# Patient Record
Sex: MALE | URBAN - NONMETROPOLITAN AREA
[De-identification: names, ages, dates, MRNs, and addresses within clinical notes are randomized per-mention and may not be internally consistent; named-entity substitution may affect disease eponyms.]

---

## 2017-06-26 ENCOUNTER — OPTICAL OFFICE (OUTPATIENT)
Dept: URBAN - NONMETROPOLITAN AREA CLINIC 4 | Facility: CLINIC | Age: 22
Setting detail: OPHTHALMOLOGY
End: 2017-06-26

## 2017-06-26 ENCOUNTER — RX ONLY (RX ONLY)
Age: 22
End: 2017-06-26

## 2017-06-26 ENCOUNTER — DOCTOR'S OFFICE (OUTPATIENT)
Dept: URBAN - NONMETROPOLITAN AREA CLINIC 1 | Facility: CLINIC | Age: 22
Setting detail: OPHTHALMOLOGY
End: 2017-06-26
Payer: COMMERCIAL

## 2017-06-26 DIAGNOSIS — H52.13: ICD-10-CM

## 2017-06-26 PROCEDURE — 92014 COMPRE OPH EXAM EST PT 1/>: CPT | Performed by: OPTOMETRIST

## 2017-06-26 PROCEDURE — S0500 DISPOS CONT LENS: HCPCS | Performed by: OPTOMETRIST

## 2017-06-26 PROCEDURE — 92310 CONTACT LENS FITTING OU: CPT | Performed by: OPTOMETRIST

## 2017-06-26 ASSESSMENT — REFRACTION_OUTSIDERX
OD_VA3: 20/
OS_AXIS: 080
OS_VA3: 20/
OD_VA2: 20/20
OD_VA1: 20/20
OD_AXIS: 105
OS_SPHERE: -5.50
OU_VA: 20/20
OS_VA1: 20/20
OD_SPHERE: -5.25
OD_CYLINDER: -0.50
OS_CYLINDER: -0.75
OS_VA2: 20/20

## 2017-06-26 ASSESSMENT — REFRACTION_MANIFEST
OD_VA1: 20/
OS_VA2: 20/
OD_VA1: 20/
OU_VA: 20/
OD_VA2: 20/
OD_VA3: 20/
OU_VA: 20/
OS_VA2: 20/
OD_VA2: 20/
OS_VA3: 20/
OS_VA1: 20/
OD_VA3: 20/
OS_VA1: 20/
OS_VA3: 20/

## 2017-06-26 ASSESSMENT — VISUAL ACUITY
OD_BCVA: 20/25-2
OS_BCVA: 20/30+2

## 2017-06-26 ASSESSMENT — REFRACTION_AUTOREFRACTION
OD_AXIS: 109
OD_CYLINDER: -0.50
OS_SPHERE: -5.25
OS_CYLINDER: -0.75
OD_SPHERE: -5.00
OS_AXIS: 78

## 2017-06-26 ASSESSMENT — REFRACTION_CURRENTRX
OS_CYLINDER: -1.00
OS_OVR_VA: 20/
OD_OVR_VA: 20/
OD_OVR_VA: 20/
OD_AXIS: 101
OS_OVR_VA: 20/
OS_AXIS: 73
OS_OVR_VA: 20/
OD_OVR_VA: 20/
OD_VPRISM_DIRECTION: SV
OS_VPRISM_DIRECTION: SV
OS_SPHERE: -5.25
OD_CYLINDER: -0.75
OD_SPHERE: -5.00

## 2017-06-26 ASSESSMENT — SPHEQUIV_DERIVED
OD_SPHEQUIV: -5.25
OS_SPHEQUIV: -5.625

## 2017-06-26 ASSESSMENT — CONFRONTATIONAL VISUAL FIELD TEST (CVF)
OD_FINDINGS: FULL
OS_FINDINGS: FULL

## 2017-12-13 ENCOUNTER — OPTICAL OFFICE (OUTPATIENT)
Dept: URBAN - NONMETROPOLITAN AREA CLINIC 4 | Facility: CLINIC | Age: 22
Setting detail: OPHTHALMOLOGY
End: 2017-12-13
Payer: COMMERCIAL

## 2017-12-13 DIAGNOSIS — H52.13: ICD-10-CM

## 2017-12-13 PROCEDURE — S0500 DISPOS CONT LENS: HCPCS | Performed by: OPTOMETRIST

## 2018-09-07 ENCOUNTER — DOCTOR'S OFFICE (OUTPATIENT)
Dept: URBAN - NONMETROPOLITAN AREA CLINIC 1 | Facility: CLINIC | Age: 23
Setting detail: OPHTHALMOLOGY
End: 2018-09-07
Payer: COMMERCIAL

## 2018-09-07 DIAGNOSIS — H52.13: ICD-10-CM

## 2018-09-07 DIAGNOSIS — Z01.00: ICD-10-CM

## 2018-09-07 PROCEDURE — 92014 COMPRE OPH EXAM EST PT 1/>: CPT | Performed by: OPTOMETRIST

## 2018-09-07 ASSESSMENT — REFRACTION_AUTOREFRACTION
OS_CYLINDER: -0.75
OS_AXIS: 78
OD_SPHERE: -5.00
OD_AXIS: 109
OS_SPHERE: -5.25
OD_CYLINDER: -0.50

## 2018-09-07 ASSESSMENT — REFRACTION_MANIFEST
OD_VA2: 20/
OS_VA2: 20/
OS_VA1: 20/
OS_VA3: 20/
OS_SPHERE: -5.50
OS_VA2: 20/20
OD_CYLINDER: -0.50
OD_VA2: 20/20
OS_VA3: 20/
OD_VA1: 20/20
OD_VA1: 20/
OD_AXIS: 105
OS_VA1: 20/20
OD_SPHERE: -5.25
OD_VA3: 20/
OU_VA: 20/20
OS_CYLINDER: -0.75
OD_VA3: 20/
OS_AXIS: 080
OU_VA: 20/

## 2018-09-07 ASSESSMENT — SPHEQUIV_DERIVED
OD_SPHEQUIV: -5.25
OS_SPHEQUIV: -5.875
OS_SPHEQUIV: -5.625
OD_SPHEQUIV: -5.5

## 2018-09-07 ASSESSMENT — VISUAL ACUITY
OD_BCVA: 20/25+2
OS_BCVA: 20/20-1

## 2018-09-07 ASSESSMENT — REFRACTION_CURRENTRX
OS_SPHERE: -5.25
OD_CYLINDER: -0.75
OS_OVR_VA: 20/
OD_VPRISM_DIRECTION: SV
OS_OVR_VA: 20/
OS_AXIS: 73
OD_OVR_VA: 20/
OD_OVR_VA: 20/
OS_VPRISM_DIRECTION: SV
OD_SPHERE: -5.00
OD_OVR_VA: 20/
OS_CYLINDER: -1.00
OS_OVR_VA: 20/
OD_AXIS: 101

## 2018-09-07 ASSESSMENT — CONFRONTATIONAL VISUAL FIELD TEST (CVF)
OD_FINDINGS: FULL
OS_FINDINGS: FULL

## 2018-09-14 ENCOUNTER — OPTICAL OFFICE (OUTPATIENT)
Dept: URBAN - NONMETROPOLITAN AREA CLINIC 4 | Facility: CLINIC | Age: 23
Setting detail: OPHTHALMOLOGY
End: 2018-09-14

## 2018-09-14 DIAGNOSIS — H52.13: ICD-10-CM

## 2018-09-14 PROCEDURE — S0500 DISPOS CONT LENS: HCPCS | Performed by: OPTOMETRIST

## 2019-02-18 ENCOUNTER — OPTICAL OFFICE (OUTPATIENT)
Dept: URBAN - NONMETROPOLITAN AREA CLINIC 4 | Facility: CLINIC | Age: 24
Setting detail: OPHTHALMOLOGY
End: 2019-02-18
Payer: COMMERCIAL

## 2019-02-18 ENCOUNTER — OPTICAL OFFICE (OUTPATIENT)
Dept: URBAN - NONMETROPOLITAN AREA CLINIC 4 | Facility: CLINIC | Age: 24
Setting detail: OPHTHALMOLOGY
End: 2019-02-18

## 2019-02-18 DIAGNOSIS — H52.13: ICD-10-CM

## 2019-02-18 DIAGNOSIS — H52.223: ICD-10-CM

## 2019-02-18 PROCEDURE — V2744 TINT PHOTOCHROMATIC LENS/ES: HCPCS | Performed by: OPTOMETRIST

## 2019-02-18 PROCEDURE — V2025 EYEGLASSES DELUX FRAMES: HCPCS | Performed by: OPTOMETRIST

## 2019-02-18 PROCEDURE — V2107 SPHEROCYLINDER 4.25D/12-2D: HCPCS | Performed by: OPTOMETRIST

## 2019-02-18 PROCEDURE — V2750 ANTI-REFLECTIVE COATING: HCPCS | Performed by: OPTOMETRIST

## 2019-02-18 PROCEDURE — V2020 VISION SVCS FRAMES PURCHASES: HCPCS | Performed by: OPTOMETRIST

## 2019-02-18 PROCEDURE — V2783 LENS, >= 1.66 P/>=1.80 G: HCPCS | Performed by: OPTOMETRIST

## 2019-02-18 PROCEDURE — S0500 DISPOS CONT LENS: HCPCS | Performed by: OPTOMETRIST

## 2019-08-14 ENCOUNTER — OPTICAL OFFICE (OUTPATIENT)
Dept: URBAN - NONMETROPOLITAN AREA CLINIC 4 | Facility: CLINIC | Age: 24
Setting detail: OPHTHALMOLOGY
End: 2019-08-14

## 2019-08-14 DIAGNOSIS — H52.13: ICD-10-CM

## 2019-08-14 PROCEDURE — S0500 DISPOS CONT LENS: HCPCS | Performed by: OPTOMETRIST

## 2019-09-10 ENCOUNTER — DOCTOR'S OFFICE (OUTPATIENT)
Dept: URBAN - NONMETROPOLITAN AREA CLINIC 1 | Facility: CLINIC | Age: 24
Setting detail: OPHTHALMOLOGY
End: 2019-09-10
Payer: COMMERCIAL

## 2019-09-10 DIAGNOSIS — H52.13: ICD-10-CM

## 2019-09-10 PROCEDURE — 92014 COMPRE OPH EXAM EST PT 1/>: CPT | Performed by: OPTOMETRIST

## 2019-09-10 PROCEDURE — 92310 CONTACT LENS FITTING OU: CPT | Performed by: OPTOMETRIST

## 2019-09-10 ASSESSMENT — REFRACTION_MANIFEST
OD_VA1: 20/20
OS_CYLINDER: -0.75
OD_VA2: 20/20
OS_VA2: 20/20
OS_AXIS: 080
OS_VA1: 20/20
OS_VA3: 20/
OS_SPHERE: -5.50
OD_AXIS: 105
OD_VA2: 20/
OD_VA3: 20/
OD_CYLINDER: -0.50
OU_VA: 20/20
OS_VA3: 20/
OD_VA3: 20/
OU_VA: 20/
OD_VA1: 20/
OS_VA1: 20/
OD_SPHERE: -5.25
OS_VA2: 20/

## 2019-09-10 ASSESSMENT — REFRACTION_CURRENTRX
OS_OVR_VA: 20/
OD_OVR_VA: 20/
OD_SPHERE: -5.25
OS_VPRISM_DIRECTION: SV
OS_OVR_VA: 20/
OS_CYLINDER: -0.75
OS_SPHERE: -5.50
OS_OVR_VA: 20/
OD_OVR_VA: 20/
OD_CYLINDER: -0.50
OD_AXIS: 105
OS_AXIS: 080
OD_OVR_VA: 20/
OD_VPRISM_DIRECTION: SV

## 2019-09-10 ASSESSMENT — REFRACTION_AUTOREFRACTION
OD_AXIS: 101
OS_SPHERE: -5.50
OS_AXIS: 69
OD_SPHERE: -5.00
OS_CYLINDER: -0.75
OD_CYLINDER: -0.25

## 2019-09-10 ASSESSMENT — VISUAL ACUITY
OS_BCVA: 20/20-2
OD_BCVA: 20/20-2

## 2019-09-10 ASSESSMENT — SPHEQUIV_DERIVED
OD_SPHEQUIV: -5.125
OS_SPHEQUIV: -5.875
OS_SPHEQUIV: -5.875
OD_SPHEQUIV: -5.5

## 2019-09-10 ASSESSMENT — CONFRONTATIONAL VISUAL FIELD TEST (CVF)
OD_FINDINGS: FULL
OS_FINDINGS: FULL

## 2020-01-31 ENCOUNTER — OPTICAL OFFICE (OUTPATIENT)
Dept: URBAN - NONMETROPOLITAN AREA CLINIC 4 | Facility: CLINIC | Age: 25
Setting detail: OPHTHALMOLOGY
End: 2020-01-31

## 2020-01-31 DIAGNOSIS — H52.13: ICD-10-CM

## 2020-01-31 PROCEDURE — S0500 DISPOS CONT LENS: HCPCS | Performed by: OPTOMETRIST

## 2020-07-22 ENCOUNTER — OPTICAL OFFICE (OUTPATIENT)
Dept: URBAN - NONMETROPOLITAN AREA CLINIC 4 | Facility: CLINIC | Age: 25
Setting detail: OPHTHALMOLOGY
End: 2020-07-22

## 2020-07-22 DIAGNOSIS — H52.13: ICD-10-CM

## 2020-07-22 PROCEDURE — S0500 DISPOS CONT LENS: HCPCS | Performed by: OPTOMETRIST

## 2020-09-14 ENCOUNTER — DOCTOR'S OFFICE (OUTPATIENT)
Dept: URBAN - NONMETROPOLITAN AREA CLINIC 1 | Facility: CLINIC | Age: 25
Setting detail: OPHTHALMOLOGY
End: 2020-09-14
Payer: COMMERCIAL

## 2020-09-14 DIAGNOSIS — Z01.00: ICD-10-CM

## 2020-09-14 DIAGNOSIS — H52.13: ICD-10-CM

## 2020-09-14 PROCEDURE — 92014 COMPRE OPH EXAM EST PT 1/>: CPT | Performed by: OPTOMETRIST

## 2020-09-14 ASSESSMENT — CONFRONTATIONAL VISUAL FIELD TEST (CVF)
OD_FINDINGS: FULL
OS_FINDINGS: FULL

## 2020-12-29 ENCOUNTER — OPTICAL OFFICE (OUTPATIENT)
Dept: URBAN - NONMETROPOLITAN AREA CLINIC 4 | Facility: CLINIC | Age: 25
Setting detail: OPHTHALMOLOGY
End: 2020-12-29
Payer: COMMERCIAL

## 2020-12-29 DIAGNOSIS — H52.13: ICD-10-CM

## 2020-12-29 PROCEDURE — S0500 DISPOS CONT LENS: HCPCS | Performed by: OPTOMETRIST

## 2021-01-04 ASSESSMENT — REFRACTION_MANIFEST
OS_VA2: 20/20
OU_VA: 20/20
OD_CYLINDER: -0.50
OS_CYLINDER: -0.75
OD_AXIS: 105
OD_VA2: 20/20
OD_SPHERE: -5.25
OS_VA1: 20/20
OS_AXIS: 080
OD_VA1: 20/20
OS_SPHERE: -5.50

## 2021-01-04 ASSESSMENT — SPHEQUIV_DERIVED
OS_SPHEQUIV: -5.875
OD_SPHEQUIV: -5.5
OD_SPHEQUIV: -5.125
OS_SPHEQUIV: -5.875

## 2021-01-04 ASSESSMENT — REFRACTION_CURRENTRX
OD_AXIS: 105
OS_OVR_VA: 20/
OD_SPHERE: -5.25
OS_CYLINDER: -0.75
OD_CYLINDER: -0.50
OS_AXIS: 080
OS_SPHERE: -5.50
OD_VPRISM_DIRECTION: SV
OS_VPRISM_DIRECTION: SV
OD_OVR_VA: 20/

## 2021-01-04 ASSESSMENT — VISUAL ACUITY
OS_BCVA: 20/25-2
OD_BCVA: 20/25-2

## 2021-01-04 ASSESSMENT — REFRACTION_AUTOREFRACTION
OS_AXIS: 69
OD_SPHERE: -5.00
OD_CYLINDER: -0.25
OS_SPHERE: -5.50
OD_AXIS: 101
OS_CYLINDER: -0.75

## 2021-03-16 ENCOUNTER — OPTICAL OFFICE (OUTPATIENT)
Dept: URBAN - NONMETROPOLITAN AREA CLINIC 4 | Facility: CLINIC | Age: 26
Setting detail: OPHTHALMOLOGY
End: 2021-03-16

## 2021-03-16 DIAGNOSIS — H52.13: ICD-10-CM

## 2021-03-16 PROCEDURE — S0500 DISPOS CONT LENS: HCPCS | Performed by: OPTOMETRIST

## 2021-06-17 ENCOUNTER — OPTICAL OFFICE (OUTPATIENT)
Dept: URBAN - NONMETROPOLITAN AREA CLINIC 4 | Facility: CLINIC | Age: 26
Setting detail: OPHTHALMOLOGY
End: 2021-06-17

## 2021-06-17 DIAGNOSIS — H52.13: ICD-10-CM

## 2021-06-17 PROCEDURE — S0500 DISPOS CONT LENS: HCPCS | Performed by: OPTOMETRIST

## 2021-09-15 ENCOUNTER — DOCTOR'S OFFICE (OUTPATIENT)
Dept: URBAN - NONMETROPOLITAN AREA CLINIC 1 | Facility: CLINIC | Age: 26
Setting detail: OPHTHALMOLOGY
End: 2021-09-15
Payer: COMMERCIAL

## 2021-09-15 ENCOUNTER — OPTICAL OFFICE (OUTPATIENT)
Dept: URBAN - NONMETROPOLITAN AREA CLINIC 4 | Facility: CLINIC | Age: 26
Setting detail: OPHTHALMOLOGY
End: 2021-09-15

## 2021-09-15 DIAGNOSIS — H52.13: ICD-10-CM

## 2021-09-15 DIAGNOSIS — Z01.00: ICD-10-CM

## 2021-09-15 PROCEDURE — S0500 DISPOS CONT LENS: HCPCS | Performed by: OPTOMETRIST

## 2021-09-15 PROCEDURE — 92310 CONTACT LENS FITTING OU: CPT | Performed by: OPTOMETRIST

## 2021-09-15 PROCEDURE — 92014 COMPRE OPH EXAM EST PT 1/>: CPT | Performed by: OPTOMETRIST

## 2021-09-15 ASSESSMENT — REFRACTION_MANIFEST
OD_CYLINDER: -0.50
OS_AXIS: 080
OS_CYLINDER: -0.75
OD_VA1: 20/20
OS_VA1: 20/20
OS_SPHERE: -5.50
OS_VA2: 20/20
OD_SPHERE: -5.25
OD_AXIS: 105
OU_VA: 20/20
OD_VA2: 20/20

## 2021-09-15 ASSESSMENT — REFRACTION_CURRENTRX
OS_SPHERE: -5.50
OS_VPRISM_DIRECTION: SV
OS_CYLINDER: -0.75
OD_AXIS: 105
OS_OVR_VA: 20/
OD_VPRISM_DIRECTION: SV
OD_SPHERE: -5.25
OS_AXIS: 080
OD_OVR_VA: 20/
OD_CYLINDER: -0.50

## 2021-09-15 ASSESSMENT — REFRACTION_AUTOREFRACTION
OS_AXIS: 69
OD_SPHERE: -5.00
OD_AXIS: 101
OD_CYLINDER: -0.25
OS_CYLINDER: -0.75
OS_SPHERE: -5.50

## 2021-09-15 ASSESSMENT — TONOMETRY
OS_IOP_MMHG: 14
OD_IOP_MMHG: 14

## 2021-09-15 ASSESSMENT — VISUAL ACUITY
OD_BCVA: 20/25-2
OS_BCVA: 20/20-1

## 2021-09-15 ASSESSMENT — SPHEQUIV_DERIVED
OD_SPHEQUIV: -5.125
OS_SPHEQUIV: -5.875
OS_SPHEQUIV: -5.875
OD_SPHEQUIV: -5.5

## 2021-09-15 ASSESSMENT — CONFRONTATIONAL VISUAL FIELD TEST (CVF)
OD_FINDINGS: FULL
OS_FINDINGS: FULL

## 2022-05-19 ENCOUNTER — OPTICAL OFFICE (OUTPATIENT)
Dept: URBAN - NONMETROPOLITAN AREA CLINIC 4 | Facility: CLINIC | Age: 27
Setting detail: OPHTHALMOLOGY
End: 2022-05-19

## 2022-05-19 DIAGNOSIS — H52.13: ICD-10-CM

## 2022-05-19 PROCEDURE — S0500 DISPOS CONT LENS: HCPCS | Performed by: OPTOMETRIST

## 2022-09-16 ENCOUNTER — DOCTOR'S OFFICE (OUTPATIENT)
Dept: URBAN - NONMETROPOLITAN AREA CLINIC 1 | Facility: CLINIC | Age: 27
Setting detail: OPHTHALMOLOGY
End: 2022-09-16
Payer: COMMERCIAL

## 2022-09-16 VITALS — HEIGHT: 60 IN

## 2022-09-16 DIAGNOSIS — H52.13: ICD-10-CM

## 2022-09-16 DIAGNOSIS — Z01.00: ICD-10-CM

## 2022-09-16 PROCEDURE — 92014 COMPRE OPH EXAM EST PT 1/>: CPT | Performed by: OPTOMETRIST

## 2022-09-16 PROCEDURE — 92310 CONTACT LENS FITTING OU: CPT | Performed by: OPTOMETRIST

## 2022-09-16 ASSESSMENT — REFRACTION_MANIFEST
OS_CYLINDER: -1.00
OD_VA2: 20/20
OD_AXIS: 105
OD_CYLINDER: -0.50
OS_VA2: 20/20-2
OD_SPHERE: -5.25
OS_AXIS: 080
OD_VA1: 20/20
OS_VA1: 20/20-2
OS_SPHERE: -5.50
OU_VA: 20/20

## 2022-09-16 ASSESSMENT — REFRACTION_AUTOREFRACTION
OS_CYLINDER: -1.25
OD_SPHERE: -5.75
OD_CYLINDER: -0.50
OS_SPHERE: -5.50
OS_AXIS: 083
OD_AXIS: 143

## 2022-09-16 ASSESSMENT — REFRACTION_CURRENTRX
OD_AXIS: 105
OS_OVR_VA: 20/
OD_CYLINDER: -0.50
OD_SPHERE: -5.25
OS_VPRISM_DIRECTION: SV
OS_SPHERE: -5.50
OD_OVR_VA: 20/
OS_CYLINDER: -0.75
OS_AXIS: 080
OD_VPRISM_DIRECTION: SV

## 2022-09-16 ASSESSMENT — VISUAL ACUITY
OD_BCVA: 20/25-2
OS_BCVA: 20/20-2

## 2022-09-16 ASSESSMENT — SPHEQUIV_DERIVED
OD_SPHEQUIV: -6
OD_SPHEQUIV: -5.5
OS_SPHEQUIV: -6.125
OS_SPHEQUIV: -6

## 2022-09-16 ASSESSMENT — TONOMETRY
OS_IOP_MMHG: 15
OD_IOP_MMHG: 15

## 2022-09-16 ASSESSMENT — CONFRONTATIONAL VISUAL FIELD TEST (CVF)
OD_FINDINGS: FULL
OS_FINDINGS: FULL

## 2022-10-26 ENCOUNTER — OPTICAL OFFICE (OUTPATIENT)
Dept: URBAN - NONMETROPOLITAN AREA CLINIC 4 | Facility: CLINIC | Age: 27
Setting detail: OPHTHALMOLOGY
End: 2022-10-26

## 2022-10-26 DIAGNOSIS — H52.13: ICD-10-CM

## 2022-10-26 PROCEDURE — S0500 DISPOS CONT LENS: HCPCS | Performed by: OPTOMETRIST

## 2023-05-04 ENCOUNTER — OPTICAL OFFICE (OUTPATIENT)
Dept: URBAN - NONMETROPOLITAN AREA CLINIC 4 | Facility: CLINIC | Age: 28
Setting detail: OPHTHALMOLOGY
End: 2023-05-04

## 2023-05-04 DIAGNOSIS — H52.13: ICD-10-CM

## 2023-05-04 PROCEDURE — S0500 DISPOS CONT LENS: HCPCS | Performed by: OPTOMETRIST

## 2023-07-05 ENCOUNTER — DOCTOR'S OFFICE (OUTPATIENT)
Dept: URBAN - NONMETROPOLITAN AREA CLINIC 1 | Facility: CLINIC | Age: 28
Setting detail: OPHTHALMOLOGY
End: 2023-07-05
Payer: COMMERCIAL

## 2023-07-05 DIAGNOSIS — H11.441: ICD-10-CM

## 2023-07-05 PROCEDURE — 99213 OFFICE O/P EST LOW 20 MIN: CPT | Performed by: OPHTHALMOLOGY

## 2023-07-05 ASSESSMENT — REFRACTION_MANIFEST
OD_SPHERE: -5.25
OS_AXIS: 080
OU_VA: 20/20
OS_CYLINDER: -1.00
OS_SPHERE: -5.50
OD_VA1: 20/20
OS_VA2: 20/20-2
OD_AXIS: 105
OS_VA1: 20/20-2
OD_VA2: 20/20
OD_CYLINDER: -0.50

## 2023-07-05 ASSESSMENT — CONFRONTATIONAL VISUAL FIELD TEST (CVF)
OS_FINDINGS: FULL
OD_FINDINGS: FULL

## 2023-07-05 ASSESSMENT — REFRACTION_CURRENTRX
OS_CYLINDER: -0.75
OS_OVR_VA: 20/
OD_OVR_VA: 20/
OS_SPHERE: -5.50
OS_VPRISM_DIRECTION: SV
OD_VPRISM_DIRECTION: SV
OD_AXIS: 102
OS_AXIS: 079
OD_SPHERE: -5.25
OD_CYLINDER: -0.50

## 2023-07-05 ASSESSMENT — AXIALLENGTH_DERIVED
OD_AL: 27.1094
OD_AL: 27.5671
OS_AL: 27.8318
OS_AL: 27.431

## 2023-07-05 ASSESSMENT — REFRACTION_AUTOREFRACTION
OS_SPHERE: -6.25
OD_CYLINDER: -1.75
OD_AXIS: 012
OD_SPHERE: -5.50
OS_CYLINDER: -1.00
OS_AXIS: 070

## 2023-07-05 ASSESSMENT — KERATOMETRY
OD_K1POWER_DIOPTERS: 40.75
OD_K2POWER_DIOPTERS: 41.25
OD_AXISANGLE_DEGREES: 112
OS_K1POWER_DIOPTERS: 40.75
OS_K2POWER_DIOPTERS: 41.00
OS_AXISANGLE_DEGREES: 124

## 2023-07-05 ASSESSMENT — VISUAL ACUITY
OD_BCVA: 20/25-2
OS_BCVA: 20/20-1

## 2023-07-05 ASSESSMENT — SPHEQUIV_DERIVED
OD_SPHEQUIV: -6.375
OD_SPHEQUIV: -5.5
OS_SPHEQUIV: -6.75
OS_SPHEQUIV: -6

## 2023-08-09 ENCOUNTER — OPTICAL OFFICE (OUTPATIENT)
Dept: URBAN - NONMETROPOLITAN AREA CLINIC 4 | Facility: CLINIC | Age: 28
Setting detail: OPHTHALMOLOGY
End: 2023-08-09

## 2023-08-09 DIAGNOSIS — H52.13: ICD-10-CM

## 2023-08-09 PROCEDURE — S0500 DISPOS CONT LENS: HCPCS | Performed by: OPTOMETRIST

## 2023-09-20 ENCOUNTER — DOCTOR'S OFFICE (OUTPATIENT)
Dept: URBAN - NONMETROPOLITAN AREA CLINIC 1 | Facility: CLINIC | Age: 28
Setting detail: OPHTHALMOLOGY
End: 2023-09-20
Payer: COMMERCIAL

## 2023-09-20 DIAGNOSIS — H52.13: ICD-10-CM

## 2023-09-20 PROCEDURE — 92014 COMPRE OPH EXAM EST PT 1/>: CPT | Performed by: OPTOMETRIST

## 2023-09-20 PROCEDURE — 92310 CONTACT LENS FITTING OU: CPT | Performed by: OPTOMETRIST

## 2023-09-20 ASSESSMENT — CONFRONTATIONAL VISUAL FIELD TEST (CVF)
OD_FINDINGS: FULL
OS_FINDINGS: FULL

## 2023-09-20 ASSESSMENT — REFRACTION_MANIFEST
OU_VA: 20/20
OS_CYLINDER: -1.00
OS_VA2: 20/20-2
OD_AXIS: 105
OS_SPHERE: -5.50
OS_AXIS: 080
OD_VA2: 20/20
OD_VA1: 20/20
OD_SPHERE: -5.25
OD_CYLINDER: -0.50
OS_VA1: 20/20-2

## 2023-09-20 ASSESSMENT — REFRACTION_CURRENTRX
OS_AXIS: 079
OD_VPRISM_DIRECTION: SV
OD_CYLINDER: -0.50
OS_SPHERE: -5.50
OD_OVR_VA: 20/
OS_CYLINDER: -0.75
OD_SPHERE: -5.25
OS_VPRISM_DIRECTION: SV
OS_OVR_VA: 20/
OD_AXIS: 102

## 2023-09-20 ASSESSMENT — KERATOMETRY
OS_K2POWER_DIOPTERS: 41.00
OD_K1POWER_DIOPTERS: 40.75
OS_AXISANGLE_DEGREES: 124
OS_K1POWER_DIOPTERS: 40.75
OD_K2POWER_DIOPTERS: 41.25
OD_AXISANGLE_DEGREES: 112

## 2023-09-20 ASSESSMENT — VISUAL ACUITY
OS_BCVA: 20/20-1
OD_BCVA: 20/25-1

## 2023-09-20 ASSESSMENT — REFRACTION_AUTOREFRACTION
OS_AXIS: 067
OD_SPHERE: +0.75
OD_CYLINDER: -1.25
OS_SPHERE: 0.00
OS_CYLINDER: -1.00
OD_AXIS: 089

## 2023-09-20 ASSESSMENT — SPHEQUIV_DERIVED
OS_SPHEQUIV: -0.5
OD_SPHEQUIV: 0.125
OS_SPHEQUIV: -6
OD_SPHEQUIV: -5.5

## 2023-09-20 ASSESSMENT — TONOMETRY
OD_IOP_MMHG: 15
OS_IOP_MMHG: 15

## 2023-09-20 ASSESSMENT — AXIALLENGTH_DERIVED
OS_AL: 27.431
OD_AL: 27.1094
OS_AL: 24.8108
OD_AL: 24.4948

## 2023-11-03 ENCOUNTER — OPTICAL OFFICE (OUTPATIENT)
Dept: URBAN - NONMETROPOLITAN AREA CLINIC 4 | Facility: CLINIC | Age: 28
Setting detail: OPHTHALMOLOGY
End: 2023-11-03

## 2023-11-03 DIAGNOSIS — H52.13: ICD-10-CM

## 2023-11-03 PROCEDURE — S0500 DISPOS CONT LENS: HCPCS | Mod: LT | Performed by: OPTOMETRIST

## 2023-11-03 PROCEDURE — S0500 DISPOS CONT LENS: HCPCS | Mod: RT | Performed by: OPTOMETRIST

## 2024-02-15 ENCOUNTER — OPTICAL OFFICE (OUTPATIENT)
Dept: URBAN - NONMETROPOLITAN AREA CLINIC 4 | Facility: CLINIC | Age: 29
Setting detail: OPHTHALMOLOGY
End: 2024-02-15

## 2024-02-15 DIAGNOSIS — H52.13: ICD-10-CM

## 2024-02-15 PROCEDURE — S0500 DISPOS CONT LENS: HCPCS | Mod: RT | Performed by: OPTOMETRIST

## 2024-02-15 PROCEDURE — S0500 DISPOS CONT LENS: HCPCS | Mod: LT | Performed by: OPTOMETRIST

## 2024-08-13 ENCOUNTER — OPTICAL OFFICE (OUTPATIENT)
Dept: URBAN - NONMETROPOLITAN AREA CLINIC 4 | Facility: CLINIC | Age: 29
Setting detail: OPHTHALMOLOGY
End: 2024-08-13

## 2024-08-13 DIAGNOSIS — H52.13: ICD-10-CM

## 2024-08-13 PROCEDURE — S0500 DISPOS CONT LENS: HCPCS | Mod: LT | Performed by: OPTOMETRIST

## 2024-08-13 PROCEDURE — S0500 DISPOS CONT LENS: HCPCS | Mod: RT | Performed by: OPTOMETRIST

## 2024-08-13 ASSESSMENT — REFRACTION_CURRENTRX
OS_SPHERE: -5.50
OD_VPRISM_DIRECTION: SV
OD_CYLINDER: -0.50
OS_VPRISM_DIRECTION: SV
OS_CYLINDER: -0.75
OS_AXIS: 079
OS_OVR_VA: 20/
OD_AXIS: 102
OD_SPHERE: -5.25
OD_OVR_VA: 20/

## 2024-08-13 ASSESSMENT — REFRACTION_MANIFEST
OS_SPHERE: -5.50
OD_CYLINDER: -0.50
OS_VA2: 20/20-2
OU_VA: 20/20
OD_AXIS: 105
OD_VA1: 20/20
OD_VA2: 20/20
OS_CYLINDER: -1.00
OD_SPHERE: -5.25
OS_VA1: 20/20-2
OS_AXIS: 080

## 2024-08-13 ASSESSMENT — KERATOMETRY
OS_AXISANGLE_DEGREES: 124
OS_K1POWER_DIOPTERS: 40.75
OD_K2POWER_DIOPTERS: 41.25
OS_K2POWER_DIOPTERS: 41.00
OD_AXISANGLE_DEGREES: 112
OD_K1POWER_DIOPTERS: 40.75

## 2024-09-22 ENCOUNTER — OFFICE VISIT (OUTPATIENT)
Dept: URGENT CARE | Facility: MEDICAL CENTER | Age: 29
End: 2024-09-22
Payer: COMMERCIAL

## 2024-09-22 VITALS
SYSTOLIC BLOOD PRESSURE: 118 MMHG | DIASTOLIC BLOOD PRESSURE: 74 MMHG | WEIGHT: 173 LBS | RESPIRATION RATE: 20 BRPM | TEMPERATURE: 97.6 F | OXYGEN SATURATION: 99 % | HEART RATE: 79 BPM

## 2024-09-22 DIAGNOSIS — B34.9 VIRAL ILLNESS: Primary | ICD-10-CM

## 2024-09-22 PROCEDURE — S9083 URGENT CARE CENTER GLOBAL: HCPCS

## 2024-09-22 PROCEDURE — G0382 LEV 3 HOSP TYPE B ED VISIT: HCPCS

## 2024-09-22 NOTE — PATIENT INSTRUCTIONS
You may take over the counter Tylenol (Acetaminophen) and/or Motrin (Ibuprofen) as needed, as directed on packaging. Be sure to get plenty of rest, and drinking fluids to remain hydrated.     Please follow up with your primary provider in the next several days. Should you have any worsening of symptoms, or lack of improvement please be re-evaluated. If needed for significant concerns, consider 911 or ER evaluation.     Over the counter decongestants can be used, only as directed on the box. However if you have any history of cardiac disease or high blood pressure these should be avoided, as we caution the use of these since they can make place strain on your heart and cause increase in blood pressure. It is recommended in lieu of decongestants to use cool mist vaporizer, saline nasal spray to relieve nasal congestion. It is also important to remain hydrated and drink plenty of fluids (avoiding caffeine and alcohol).     OVER THE COUNTER: Flonase nasal spray or Astepro nasal spray may be appropriate for your symptoms as well. Please follow the directions on the package for use. (Store brand is perfectly fine).   Plain Mucinex is an expectorant medication which will help to relieve the chest congestion, it is important to drink lots of fluids and keep hydrated.

## 2024-09-22 NOTE — PROGRESS NOTES
[facility]        NAME: Nahum Barrios is a 29 y.o. male  : 1995    MRN: [id number]  DATE: 2024  TIME: 11:29 AM    Assessment and Plan   Viral illness [B34.9]  1. Viral illness              Patient Instructions       Follow up with PCP in 3-5 days.  Proceed to  ER if symptoms worsen.    If tests are performed, our office will contact you with results only if changes need to made to the care plan discussed with you at the visit. [facility]    Chief Complaint     Chief Complaint   Patient presents with    Cold Like Symptoms    Sore Throat     Cough congestion, sore throat. Woke up Friday with sinus pressure, temp of 100.4, feels drained. SX started on Tuesday. No N/V/D. Taking dayquil and nyquil          History of Present Illness       Symptoms started on Tuesday with flu like symptoms. Fevers onset Friday with TMAX 100.4 Symptoms include, sore throat, sinus congestion, fatigue, chest congestion, post nasal drip. At home taking: Dayquil/Nyquil- last dose was Dayquil this AM. Patient is an  and has had plenty of children around him who are sick.        Review of Systems   Review of Systems   Constitutional:  Positive for fatigue and fever. Negative for appetite change and chills.   HENT:  Positive for congestion, ear pain, postnasal drip, rhinorrhea and sore throat. Negative for sinus pressure, sinus pain and trouble swallowing.    Respiratory:  Positive for cough. Negative for chest tightness and shortness of breath.         Cough- Productive for dark yellow mucus    Gastrointestinal:  Negative for abdominal pain, constipation, diarrhea, nausea and vomiting.        Loose stools on Friday- normal since that time.    Skin:  Negative for color change and rash.   Neurological:  Positive for light-headedness. Negative for dizziness and headaches.        Felt light headed Friday.            Current Medications     No  current outpatient medications on file.    Current Allergies     Allergies as of 09/22/2024 - Reviewed 09/22/2024   Allergen Reaction Noted    Cefprozil Hives 06/17/2013    Erythromycin base Other (See Comments) 06/15/2013            The following portions of the patient's history were reviewed and updated as appropriate: allergies, current medications, past family history, past medical history, past social history, past surgical history and problem list.     History reviewed. No pertinent past medical history.    History reviewed. No pertinent surgical history.    History reviewed. No pertinent family history.      Medications have been verified.        Objective   /74   Pulse 79   Temp 97.6 °F (36.4 °C)   Resp 20   Wt 78.5 kg (173 lb)   SpO2 99%        Physical Exam     Physical Exam  Vitals and nursing note reviewed.   Constitutional:       General: He is awake. He is not in acute distress.     Appearance: Normal appearance. He is well-developed and normal weight. He is ill-appearing.   HENT:      Head: Normocephalic and atraumatic.      Right Ear: Tympanic membrane, ear canal and external ear normal. Tympanic membrane is not erythematous or bulging.      Left Ear: Tympanic membrane, ear canal and external ear normal. Tympanic membrane is not erythematous or bulging.      Nose: Congestion present. No rhinorrhea.      Mouth/Throat:      Lips: Pink.      Mouth: Mucous membranes are moist.      Pharynx: Oropharynx is clear.   Eyes:      Extraocular Movements: Extraocular movements intact.      Conjunctiva/sclera: Conjunctivae normal.      Pupils: Pupils are equal, round, and reactive to light.   Cardiovascular:      Rate and Rhythm: Normal rate and regular rhythm.      Pulses: Normal pulses.      Heart sounds: Normal heart sounds, S1 normal and S2 normal. No murmur heard.  Pulmonary:      Effort: Pulmonary effort is normal.      Breath sounds: Normal breath sounds. No decreased breath sounds, wheezing or  rhonchi.   Abdominal:      General: Abdomen is flat. Bowel sounds are normal.      Palpations: Abdomen is soft.   Musculoskeletal:         General: Normal range of motion.      Cervical back: Full passive range of motion without pain, normal range of motion and neck supple.   Skin:     General: Skin is warm and dry.      Capillary Refill: Capillary refill takes less than 2 seconds.      Findings: No rash.   Neurological:      General: No focal deficit present.      Mental Status: He is alert and oriented to person, place, and time.   Psychiatric:         Mood and Affect: Mood normal.         Behavior: Behavior normal. Behavior is cooperative.

## 2024-09-25 ENCOUNTER — DOCTOR'S OFFICE (OUTPATIENT)
Dept: URBAN - NONMETROPOLITAN AREA CLINIC 1 | Facility: CLINIC | Age: 29
Setting detail: OPHTHALMOLOGY
End: 2024-09-25
Payer: COMMERCIAL

## 2024-09-25 VITALS — HEIGHT: 60 IN

## 2024-09-25 DIAGNOSIS — H52.13: ICD-10-CM

## 2024-09-25 PROBLEM — H35.40 PERIPAPILLARY ATROPHY: Status: ACTIVE | Noted: 2024-09-25

## 2024-09-25 PROBLEM — H43.393 VITREOUS FLOATERS; BOTH EYES: Status: ACTIVE | Noted: 2024-09-25

## 2024-09-25 PROCEDURE — 92014 COMPRE OPH EXAM EST PT 1/>: CPT | Performed by: OPTOMETRIST

## 2024-09-25 PROCEDURE — 92310 CONTACT LENS FITTING OU: CPT | Performed by: OPTOMETRIST

## 2024-09-25 ASSESSMENT — REFRACTION_MANIFEST
OS_VA1: 20/20-2
OS_VA2: 20/20-2
OU_VA: 20/20
OS_AXIS: 080
OD_AXIS: 105
OD_CYLINDER: -0.50
OD_VA1: 20/20
OD_SPHERE: -5.50
OS_CYLINDER: -1.00
OS_SPHERE: -5.50
OD_VA2: 20/20

## 2024-09-25 ASSESSMENT — REFRACTION_CURRENTRX
OS_AXIS: 74
OD_SPHERE: -5.25
OD_VPRISM_DIRECTION: SV
OS_VPRISM_DIRECTION: SV
OD_CYLINDER: -0.52
OD_OVR_VA: 20/
OS_SPHERE: --7.00
OS_OVR_VA: 20/
OS_CYLINDER: -0.75
OD_AXIS: 116

## 2024-09-25 ASSESSMENT — KERATOMETRY
OD_K1POWER_DIOPTERS: 40.75
OS_AXISANGLE_DEGREES: 124
OD_K2POWER_DIOPTERS: 41.25
OS_K2POWER_DIOPTERS: 41.00
OS_K1POWER_DIOPTERS: 40.75
OD_AXISANGLE_DEGREES: 112

## 2024-09-25 ASSESSMENT — CONFRONTATIONAL VISUAL FIELD TEST (CVF)
OS_FINDINGS: FULL
OD_FINDINGS: FULL

## 2024-09-25 ASSESSMENT — REFRACTION_AUTOREFRACTION
OS_SPHERE: -7.00
OD_SPHERE: -5.50
OD_AXIS: 119
OD_CYLINDER: -0.50
OS_CYLINDER: -0.75
OS_AXIS: 074

## 2024-09-25 ASSESSMENT — VISUAL ACUITY
OS_BCVA: 20/20-1
OD_BCVA: 20/20-1

## 2024-09-25 ASSESSMENT — TONOMETRY
OS_IOP_MMHG: 15
OD_IOP_MMHG: 15

## 2025-03-10 ENCOUNTER — OPTICAL OFFICE (OUTPATIENT)
Dept: URBAN - NONMETROPOLITAN AREA CLINIC 4 | Facility: CLINIC | Age: 30
Setting detail: OPHTHALMOLOGY
End: 2025-03-10

## 2025-03-10 DIAGNOSIS — H52.13: ICD-10-CM

## 2025-03-10 PROCEDURE — S0500 DISPOS CONT LENS: HCPCS | Mod: LT | Performed by: OPTOMETRIST

## 2025-03-10 PROCEDURE — S0500 DISPOS CONT LENS: HCPCS | Mod: RT | Performed by: OPTOMETRIST

## 2025-08-12 ENCOUNTER — OPTICAL OFFICE (OUTPATIENT)
Dept: URBAN - NONMETROPOLITAN AREA CLINIC 4 | Facility: CLINIC | Age: 30
Setting detail: OPHTHALMOLOGY
End: 2025-08-12

## 2025-08-12 DIAGNOSIS — H52.13: ICD-10-CM

## 2025-08-12 PROCEDURE — S0500 DISPOS CONT LENS: HCPCS | Mod: LT | Performed by: OPTOMETRIST

## 2025-08-12 PROCEDURE — S0500 DISPOS CONT LENS: HCPCS | Mod: RT | Performed by: OPTOMETRIST
